# Patient Record
Sex: MALE
[De-identification: names, ages, dates, MRNs, and addresses within clinical notes are randomized per-mention and may not be internally consistent; named-entity substitution may affect disease eponyms.]

---

## 2019-07-01 ENCOUNTER — TRANSCRIPTION ENCOUNTER (OUTPATIENT)
Age: 34
End: 2019-07-01

## 2019-07-10 ENCOUNTER — APPOINTMENT (OUTPATIENT)
Dept: OTOLARYNGOLOGY | Facility: CLINIC | Age: 34
End: 2019-07-10
Payer: COMMERCIAL

## 2019-07-10 VITALS
DIASTOLIC BLOOD PRESSURE: 75 MMHG | HEART RATE: 55 BPM | WEIGHT: 198 LBS | SYSTOLIC BLOOD PRESSURE: 124 MMHG | BODY MASS INDEX: 28.35 KG/M2 | HEIGHT: 70 IN

## 2019-07-10 DIAGNOSIS — Z78.9 OTHER SPECIFIED HEALTH STATUS: ICD-10-CM

## 2019-07-10 DIAGNOSIS — Z87.01 PERSONAL HISTORY OF PNEUMONIA (RECURRENT): ICD-10-CM

## 2019-07-10 DIAGNOSIS — H90.41 SENSORINEURAL HEARING LOSS, UNILATERAL, RIGHT EAR, WITH UNRESTRICTED HEARING ON THE CONTRALATERAL SIDE: ICD-10-CM

## 2019-07-10 PROBLEM — Z00.00 ENCOUNTER FOR PREVENTIVE HEALTH EXAMINATION: Status: ACTIVE | Noted: 2019-07-10

## 2019-07-10 PROCEDURE — 99203 OFFICE O/P NEW LOW 30 MIN: CPT

## 2019-07-10 PROCEDURE — 92567 TYMPANOMETRY: CPT

## 2019-07-10 PROCEDURE — 92557 COMPREHENSIVE HEARING TEST: CPT

## 2019-07-10 NOTE — ASSESSMENT
[FreeTextEntry1] : Left high-frequency mild asymmetry. The tympanograms were normal type A. We discussed possible sudden sensorineural loss. He understands this is most likely of viral etiology. Recommend a trial of Medrol Dosepak and followup in 5 days.  We discussed the risks, benefits, and alternatives of steroids at length as including but not limited to stomach upset, insomnia, hyperactivity, cataracts, glaucoma, reflux exacerbation, gastric ulcer exacerbation, avascular necrosis of the hip, poor wound healing.  They understand they must avoid alcohol and all questions were answered.  They will call if they have any concerns about diabetes or other possible side effects\par \par If not resolution, consider further Retrocochlear workup\par \par

## 2019-07-10 NOTE — HISTORY OF PRESENT ILLNESS
[de-identified] : Patient seen for the first time today. 10 days ago developed left-sided otalgia after a sore throat. The he presented to urgent care and was diagnosed with otitis. Given oral antibiotics of which she does not remember the name but took 500 mg 3 times a day. He stopped after 4 days because the ear pain was not improving. Denies otorrhea, tinnitus, or vertigo.  Patient has no previous otologic history or acoustic trauma.\par \par

## 2019-07-17 ENCOUNTER — APPOINTMENT (OUTPATIENT)
Dept: OTOLARYNGOLOGY | Facility: CLINIC | Age: 34
End: 2019-07-17
Payer: COMMERCIAL

## 2019-07-17 DIAGNOSIS — H91.20 SUDDEN IDIOPATHIC HEARING LOSS, UNSPECIFIED EAR: ICD-10-CM

## 2019-07-17 DIAGNOSIS — H93.12 TINNITUS, LEFT EAR: ICD-10-CM

## 2019-07-17 PROCEDURE — 92552 PURE TONE AUDIOMETRY AIR: CPT

## 2019-07-17 PROCEDURE — 99213 OFFICE O/P EST LOW 20 MIN: CPT

## 2019-07-18 PROBLEM — H91.20 SUDDEN-ONSET SENSORINEURAL HEARING LOSS: Status: ACTIVE | Noted: 2019-07-10

## 2019-07-18 RX ORDER — METHYLPREDNISOLONE 4 MG/1
4 TABLET ORAL
Qty: 1 | Refills: 0 | Status: COMPLETED | COMMUNITY
Start: 2019-07-10 | End: 2019-07-15

## 2019-07-18 NOTE — HISTORY OF PRESENT ILLNESS
[de-identified] : Patient seen July 10.  Reported 10 days prior developed left-sided otalgia and HL after a sore throat. The he presented to urgent care and was diagnosed with otitis. Given oral antibiotics of which he does not remember the name but took 500 mg 3 times a day. He stopped after 4 days because the ear pain and HL was not improving. Denies otorrhea, tinnitus, or vertigo.  Patient has no previous otologic history or acoustic trauma.\par \par Audiogram consistent with left high-frequency asymmetry at 8000 Hz. We discussed possibility of sudden sensory neural loss. He opted for aggressive treatment with Medrol Dosepak. Feels about 40% improved. Had no issues with steroids\par \par

## 2019-07-18 NOTE — ASSESSMENT
[FreeTextEntry1] : Left high-frequency mild asymmetry, Resolved after Medrol.He has concerns over acoustic trauma from his  job. I reassured him his audiogram was normal.At this point he does not meet criteria for Retrocochlear workup.  Discussed hearing protection, followup yearly audiogram\par \par